# Patient Record
(demographics unavailable — no encounter records)

---

## 2024-10-17 NOTE — ASSESSMENT
[FreeTextEntry1] : 1. Kidney donor. Discussed that her eGFR is the expected range.  In absence or proteinuria and hypertension, there is no evidence of any underlying progressive renal disease.   2. Hand arthritis. We discussed that there are no contraindication to the use of NSAIDs before activity. Caution w/ the frequent use of NSAIDs. 3. Anxiety, high stress. Discussed stress mitigation techniques including Yoga, aerobic exercise and breathing meditation.  Return as needed.

## 2024-10-17 NOTE — REASON FOR VISIT
[Consultation] : a consultation visit [FreeTextEntry1] : Referred by Dr. Sierra for eGFR of 67 cc/min

## 2024-10-17 NOTE — CONSULT LETTER
[Dear  ___] : Dear  [unfilled], [Consult Letter:] : I had the pleasure of evaluating your patient, [unfilled]. [Please see my note below.] : Please see my note below. [Consult Closing:] : Thank you very much for allowing me to participate in the care of this patient.  If you have any questions, please do not hesitate to contact me. [Sincerely,] : Sincerely, [FreeTextEntry2] : Dr. Fredi Sierra [address] [place] [facility] [place] [address] [FreeTextEntry1] : Normal (expected) eGFR of 67 cc/min after kidney donation. No proteinuria, normal blood pressure.  No risk of progressive renal disease.  Significant hand osteoarthritis limiting playing golf. Discusses that the are no contraindications to the occasional, pre activity use of NSAIDs. [FreeTextEntry3] : Blane Reeves MD

## 2024-10-17 NOTE — HISTORY OF PRESENT ILLNESS
[FreeTextEntry1] : The patient is an excellent historian.  Daughter was born w/ liver disease, had a liver transplant at very young age and developed renal failure from calcineurin chronic nephrotoxicity.  The patient donated her kidney in October of 2023 to the donor pool and the daughter was transplanted in February of this year.  The patient is basically healthy. Normal blood pressure, no smoking, very occasional alcohol intake. Has osteopenia,  She has significant symptomatic osteoarthritis especially of her hands which limits her ability to play golf. She is avoiding NSAIDs out of concern about her renal function.  There is no family history of renal disease (except her daughter).  Allergy to tetracycline and gluten sensitivity.  The patient is experiencing significant stress and anxiety related to the situation with her daughter.  She is seen today to discuss the significance of her eGFR of 67 and how to prevent further decrease in her renal function.

## 2024-11-01 NOTE — HEALTH RISK ASSESSMENT
[Yes] : Yes [No falls in past year] : Patient reported no falls in the past year [Little interest or pleasure doing things] : 1) Little interest or pleasure doing things [de-identified] : Occasionally [Never] : Never

## 2024-11-01 NOTE — HISTORY OF PRESENT ILLNESS
[de-identified] : Patient here for general checkup.  She donated her kidney to her daughter approximately 1 year ago recently seen by nephrology creatinine normal no proteinuria  No cardiovascular symptoms with exertion  Hand arthritis interferes with her ability to play golf nephrologist has given permission for occasional Advil I recommend the addition of Tylenol if needed  Patient has follow-up colonoscopy scheduled  She sees OB/GYN